# Patient Record
Sex: MALE | Race: WHITE
[De-identification: names, ages, dates, MRNs, and addresses within clinical notes are randomized per-mention and may not be internally consistent; named-entity substitution may affect disease eponyms.]

---

## 2020-05-06 ENCOUNTER — HOSPITAL ENCOUNTER (EMERGENCY)
Dept: HOSPITAL 56 - MW.ED | Age: 50
Discharge: HOME | End: 2020-05-06
Payer: COMMERCIAL

## 2020-05-06 DIAGNOSIS — Z91.048: ICD-10-CM

## 2020-05-06 DIAGNOSIS — J30.2: Primary | ICD-10-CM
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
